# Patient Record
Sex: FEMALE | Race: OTHER | URBAN - METROPOLITAN AREA
[De-identification: names, ages, dates, MRNs, and addresses within clinical notes are randomized per-mention and may not be internally consistent; named-entity substitution may affect disease eponyms.]

---
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
## 2021-05-13 ENCOUNTER — APPOINTMENT (OUTPATIENT)
Dept: PHYSICAL MEDICINE AND REHAB | Facility: CLINIC | Age: 21
End: 2021-05-13

## 2021-06-09 NOTE — H&P PEDIATRIC. - RECTAL
-Current smoker   -Smoking 1/2pk /day (down from 3 packs)  -discussed the pathophysiology and relationship of smoking and vascular disease  -encourage smoking cessation Skin intact

## 2021-07-25 ENCOUNTER — EMERGENCY (EMERGENCY)
Facility: HOSPITAL | Age: 21
LOS: 1 days | Discharge: HOME | End: 2021-07-25
Attending: EMERGENCY MEDICINE | Admitting: EMERGENCY MEDICINE
Payer: SELF-PAY

## 2021-07-25 VITALS
OXYGEN SATURATION: 100 % | DIASTOLIC BLOOD PRESSURE: 74 MMHG | RESPIRATION RATE: 16 BRPM | HEART RATE: 83 BPM | TEMPERATURE: 98 F | SYSTOLIC BLOOD PRESSURE: 121 MMHG

## 2021-07-25 VITALS
HEART RATE: 117 BPM | OXYGEN SATURATION: 95 % | DIASTOLIC BLOOD PRESSURE: 73 MMHG | RESPIRATION RATE: 16 BRPM | SYSTOLIC BLOOD PRESSURE: 112 MMHG | TEMPERATURE: 98 F

## 2021-07-25 LAB
ANION GAP SERPL CALC-SCNC: 8 MMOL/L — SIGNIFICANT CHANGE UP (ref 5–17)
APAP SERPL-MCNC: <2 UG/ML — LOW (ref 10–30)
BUN SERPL-MCNC: 16 MG/DL — SIGNIFICANT CHANGE UP (ref 7–23)
CALCIUM SERPL-MCNC: 8.9 MG/DL — SIGNIFICANT CHANGE UP (ref 8.5–10.1)
CHLORIDE SERPL-SCNC: 111 MMOL/L — HIGH (ref 96–108)
CO2 SERPL-SCNC: 24 MMOL/L — SIGNIFICANT CHANGE UP (ref 22–31)
CREAT SERPL-MCNC: 0.61 MG/DL — SIGNIFICANT CHANGE UP (ref 0.5–1.3)
ETHANOL SERPL-MCNC: <10 MG/DL — SIGNIFICANT CHANGE UP (ref 0–10)
GLUCOSE SERPL-MCNC: 107 MG/DL — HIGH (ref 70–99)
HCG SERPL-ACNC: <1 MIU/ML — SIGNIFICANT CHANGE UP
HCT VFR BLD CALC: 36.8 % — SIGNIFICANT CHANGE UP (ref 34.5–45)
HGB BLD-MCNC: 12.1 G/DL — SIGNIFICANT CHANGE UP (ref 11.5–15.5)
MCHC RBC-ENTMCNC: 28.7 PG — SIGNIFICANT CHANGE UP (ref 27–34)
MCHC RBC-ENTMCNC: 32.9 GM/DL — SIGNIFICANT CHANGE UP (ref 32–36)
MCV RBC AUTO: 87.4 FL — SIGNIFICANT CHANGE UP (ref 80–100)
NRBC # BLD: 0 /100 WBCS — SIGNIFICANT CHANGE UP (ref 0–0)
PCP SPEC-MCNC: SIGNIFICANT CHANGE UP
PLATELET # BLD AUTO: 338 K/UL — SIGNIFICANT CHANGE UP (ref 150–400)
POTASSIUM SERPL-MCNC: 3.9 MMOL/L — SIGNIFICANT CHANGE UP (ref 3.5–5.3)
POTASSIUM SERPL-SCNC: 3.9 MMOL/L — SIGNIFICANT CHANGE UP (ref 3.5–5.3)
RBC # BLD: 4.21 M/UL — SIGNIFICANT CHANGE UP (ref 3.8–5.2)
RBC # FLD: 12.9 % — SIGNIFICANT CHANGE UP (ref 10.3–14.5)
SALICYLATES SERPL-MCNC: <1.7 MG/DL — LOW (ref 2.8–20)
SODIUM SERPL-SCNC: 143 MMOL/L — SIGNIFICANT CHANGE UP (ref 135–145)
WBC # BLD: 7.45 K/UL — SIGNIFICANT CHANGE UP (ref 3.8–10.5)
WBC # FLD AUTO: 7.45 K/UL — SIGNIFICANT CHANGE UP (ref 3.8–10.5)

## 2021-07-25 PROCEDURE — 80048 BASIC METABOLIC PNL TOTAL CA: CPT

## 2021-07-25 PROCEDURE — 93005 ELECTROCARDIOGRAM TRACING: CPT

## 2021-07-25 PROCEDURE — 85027 COMPLETE CBC AUTOMATED: CPT

## 2021-07-25 PROCEDURE — 80307 DRUG TEST PRSMV CHEM ANLYZR: CPT

## 2021-07-25 PROCEDURE — 84702 CHORIONIC GONADOTROPIN TEST: CPT

## 2021-07-25 PROCEDURE — 93010 ELECTROCARDIOGRAM REPORT: CPT

## 2021-07-25 PROCEDURE — 99284 EMERGENCY DEPT VISIT MOD MDM: CPT

## 2021-07-25 PROCEDURE — 36415 COLL VENOUS BLD VENIPUNCTURE: CPT

## 2021-07-25 PROCEDURE — 99285 EMERGENCY DEPT VISIT HI MDM: CPT

## 2021-07-25 RX ORDER — SODIUM CHLORIDE 9 MG/ML
1000 INJECTION INTRAMUSCULAR; INTRAVENOUS; SUBCUTANEOUS ONCE
Refills: 0 | Status: DISCONTINUED | OUTPATIENT
Start: 2021-07-25 | End: 2021-07-29

## 2021-07-25 NOTE — ED PROVIDER NOTE - CARE PROVIDER_API CALL
Ananda Shabazz)  Internal Medicine  [address] [place] [address]  Phone: [phone]  Fax: [phone]  Follow Up Time:

## 2021-07-25 NOTE — ED PROVIDER NOTE - PATIENT PORTAL LINK FT
You can access the FollowMyHealth Patient Portal offered by [facility] by registering at the following website: [email or web address]. By joining [employer]’s FollowMyHealth portal, you will also be able to view your health information using other applications (apps) compatible with our system.

## 2021-07-25 NOTE — ED ADULT NURSE NOTE - CHIEF COMPLAINT QUOTE
pt from a house party in [place], admits to smoking pot and eating 4 edibles, pt with n/v and anxiety

## 2021-07-25 NOTE — ED PROVIDER NOTE - NSFOLLOWUPINSTRUCTIONS_ED_ALL_ED_FT
Rest  Increase fluid intake  No more substance use  Follow-up with your doctor this week  Return here if needed

## 2021-07-25 NOTE — ED ADULT TRIAGE NOTE - CHIEF COMPLAINT QUOTE
pt from a house party in [place], admits to smoking pot and eating 4 edibles, pt with n/v and anxiety

## 2021-07-25 NOTE — ED ADULT NURSE NOTE - OBJECTIVE STATEMENT
Pt to ED BIBA from a house party in [place], pt is obtunded but says she ingested 3-4 pot gummies & then started having a panic attack.

## 2021-07-25 NOTE — ED PROVIDER NOTE - OBJECTIVE STATEMENT
21 yo white female, partying all night last evening and then, this afternoon ingested 2-pot gummies and now presents to ED with lethargy and feeling very sleepy. No CP, SOB, nausea, vomiting or diarrhea. Denies any SI and states that she did not co-ingest the gummys with any other substance including Alcohol.

## 2023-02-03 NOTE — DISCHARGE NOTE PEDIATRIC - CONDITIONS AT DISCHARGE
FMLA or Disability Forms were received and faxed to the Forms Completion Department today at [phone]. (Please include all appropriate authorization forms with your fax).    Did you have the patient complete (in full) and sign the “Authorization For Disclosure of Health Information Forms Completion” form? No, Reason: forms were faxed to us     DUE TO VERY HIGH FORM VOLUMES, please communicate to the patient that the Forms Completion team estimates their form may take longer than our usual 14 business day turnaround goal.    If you have questions about this encounter, please contact the Forms Completion Dept at [phone], Option 3.     stable upon discharge.

## 2024-04-10 NOTE — PROGRESS NOTE PEDS - NSHPATTENDINGPLANDISCUSS_GEN_ALL_CORE
mother and ICU staff
mother and ICU staff
ICU staff
mother and house staffs
initiation of breastfeeding/breast milk feeding

## 2024-09-05 NOTE — PROGRESS NOTE PEDS - PROBLEM SELECTOR PROBLEM 1
Acute respiratory failure, unspecified whether with hypoxia or hypercapnia
Seizure
Seizure
Detail Level: Detailed
Acute respiratory failure, unspecified whether with hypoxia or hypercapnia
Seizure

## 2025-02-12 NOTE — ED ADULT NURSE REASSESSMENT NOTE - NS ED NURSE REASSESS COMMENT FT1
Pt to be transferred to [facility] transfer team at bedside, as per staff on transfer team axillary temp now 100.3, however rectal temp performed by myself was 99.7, Dr Dias at bedside and aware. Pt continues to have tremors and siezure like movements. reports given to Jerrell BOOGIE  on transfer team. Pt belongings with  mother, all lab work and radiology reports sent with pt, report also given to Phoebe BOOGIE at [facility] ([phone]). Pt left ED with transfer team at 1932

## 2025-04-25 NOTE — H&P ADULT. - PROBLEM SELECTOR PLAN 2
Unknown -Intubated for airway protection  -Ativan and propofol for seizure activity/status  -IV hydration  -Head/C-spine: WNL  -C-collar until able to clear  -Transfer to [facility] for further care and management